# Patient Record
Sex: MALE | URBAN - METROPOLITAN AREA
[De-identification: names, ages, dates, MRNs, and addresses within clinical notes are randomized per-mention and may not be internally consistent; named-entity substitution may affect disease eponyms.]

---

## 2022-12-19 ENCOUNTER — OFFICE VISIT (OUTPATIENT)
Facility: LOCATION | Age: 42
End: 2022-12-19
Payer: COMMERCIAL

## 2022-12-19 DIAGNOSIS — H52.4 PRESBYOPIA: ICD-10-CM

## 2022-12-19 DIAGNOSIS — H43.393 OTHER VITREOUS OPACITIES, BILATERAL: Primary | ICD-10-CM

## 2022-12-19 PROCEDURE — 92004 COMPRE OPH EXAM NEW PT 1/>: CPT | Performed by: OPTOMETRIST

## 2022-12-19 ASSESSMENT — VISUAL ACUITY
OS: 20/20
OD: 20/20

## 2022-12-19 ASSESSMENT — INTRAOCULAR PRESSURE
OD: 18
OS: 17

## 2022-12-19 NOTE — IMPRESSION/PLAN
Impression: Other vitreous opacities, bilateral: H43.393. Plan: Floaters accounts for the patient's complaints. There is no evidence of retinal pathology. All signs and risks of retinal detachment and tears were discussed in detail. Patient instructed to call the office immediately if any symptoms noted. Recommend the patient return to office for follow up.

## 2022-12-19 NOTE — IMPRESSION/PLAN
Impression: Presbyopia: H52.4. Plan: Discussed etiology and management. Recommend OTC readers. If no improvement consider vision exam for prescription glasses.

## 2024-01-15 ENCOUNTER — OFFICE VISIT (OUTPATIENT)
Facility: LOCATION | Age: 44
End: 2024-01-15
Payer: COMMERCIAL

## 2024-01-15 DIAGNOSIS — H25.13 AGE-RELATED NUCLEAR CATARACT, BILATERAL: Primary | ICD-10-CM

## 2024-01-15 DIAGNOSIS — H43.393 OTHER VITREOUS OPACITIES, BILATERAL: ICD-10-CM

## 2024-01-15 PROCEDURE — 92014 COMPRE OPH EXAM EST PT 1/>: CPT

## 2024-01-15 ASSESSMENT — VISUAL ACUITY
OD: 20/20
OS: 20/20

## 2024-01-15 ASSESSMENT — INTRAOCULAR PRESSURE
OD: 16
OS: 15